# Patient Record
Sex: MALE | Race: WHITE | NOT HISPANIC OR LATINO | Employment: OTHER | URBAN - METROPOLITAN AREA
[De-identification: names, ages, dates, MRNs, and addresses within clinical notes are randomized per-mention and may not be internally consistent; named-entity substitution may affect disease eponyms.]

---

## 2024-03-06 ENCOUNTER — OFFICE VISIT (OUTPATIENT)
Dept: SPORTS MEDICINE | Facility: CLINIC | Age: 39
End: 2024-03-06
Payer: MEDICAID

## 2024-03-06 VITALS
RESPIRATION RATE: 16 BRPM | BODY MASS INDEX: 27.35 KG/M2 | HEART RATE: 74 BPM | DIASTOLIC BLOOD PRESSURE: 70 MMHG | OXYGEN SATURATION: 99 % | HEIGHT: 70 IN | SYSTOLIC BLOOD PRESSURE: 110 MMHG | WEIGHT: 191 LBS

## 2024-03-06 DIAGNOSIS — M54.12 CERVICAL RADICULITIS: Primary | ICD-10-CM

## 2024-03-06 DIAGNOSIS — M25.511 ACUTE PAIN OF RIGHT SHOULDER: ICD-10-CM

## 2024-03-06 DIAGNOSIS — Z79.01 CURRENT USE OF LONG TERM ANTICOAGULATION: ICD-10-CM

## 2024-03-06 RX ORDER — SPIRONOLACTONE 25 MG/1
25 TABLET ORAL DAILY
COMMUNITY

## 2024-03-06 RX ORDER — LOSARTAN POTASSIUM 25 MG/1
25 TABLET ORAL DAILY
COMMUNITY

## 2024-03-06 RX ORDER — METHYLPREDNISOLONE 4 MG/1
TABLET ORAL
Qty: 21 TABLET | Refills: 0 | Status: SHIPPED | OUTPATIENT
Start: 2024-03-06

## 2024-03-06 RX ORDER — WARFARIN SODIUM 5 MG/1
5 TABLET ORAL NIGHTLY
COMMUNITY
Start: 2024-02-14

## 2024-03-06 RX ORDER — ASPIRIN 81 MG/1
81 TABLET, COATED ORAL DAILY
COMMUNITY

## 2024-03-06 RX ORDER — WARFARIN SODIUM 4 MG/1
4 TABLET ORAL NIGHTLY
COMMUNITY
Start: 2024-02-14

## 2024-03-06 RX ORDER — METOPROLOL SUCCINATE 50 MG/1
50 TABLET, EXTENDED RELEASE ORAL DAILY
COMMUNITY
Start: 2024-01-02

## 2024-03-06 NOTE — PROGRESS NOTES
"Vladislav is a 38 y.o. year old male presents to [facility]    Chief Complaint   Patient presents with    Right Shoulder - Pain, Initial Evaluation     Self referral eval for RT shoulder pain x 2 weeks - NKI he an recall, pain with burning sensation on top of his arm and outside the elbow - okay with ROM - here with new x-rays for further evaluation and treatment        History of Present Illness  New patient, new problem.  Referred here by his mother who presents today for appointment.  She has been treated by me in the past with OMT.  Vladislav states that over the past 2 weeks approximately, he has noticed stiffness in his neck, lower cervical pain that radiates into his right arm.  States that the pain will radiate down to mid forearm.  He has seen some steady improvement in his symptoms over the past few days but do persist.  He does not necessarily associate nighttime pain.  Cannot take intake inflammatories as he is chronically on warfarin.  Manual labor.  He does not notice any loss of strength in the hand nor any pain shooting to the hand.    I have reviewed the patient's medical, family, and social history in detail and updated the computerized patient record.    /70 (BP Location: Right arm, Patient Position: Sitting, Cuff Size: Large Adult)   Pulse 74   Resp 16   Ht 177.8 cm (70\")   Wt 86.6 kg (191 lb)   SpO2 99%   BMI 27.41 kg/m²      Physical Exam    Vital signs reviewed.   General: No acute distress.  Eyes: conjunctiva clear; pupils equally round and reactive  ENT: external ears atraumatic  CV: no peripheral edema  Resp: normal respiratory effort, no use of accessory muscles  Skin: no rashes or wounds; normal turgor  Psych: mood and affect appropriate; recent and remote memory intact  Neuro: sensation to light touch intact    MSK Exam  Cervical spine demonstrates full range of motion.  Negative Spurling maneuver bilateral.  2+ DTR C5, C6 bilateral.  R shoulder: " Negative drop arm.  Full range of motion.  Negative empty can.  Negative Malone, scarf, Neer and apprehension test.  Negative Speed test.    Right Shoulder X-Ray  Indication: Pain  Views: AP internal and external    Findings:  No fracture  No bony lesion  Normal soft tissues  Normal joint spaces    No prior studies were available for comparison.             Diagnoses and all orders for this visit:    Cervical radiculitis  -     methylPREDNISolone (MEDROL) 4 MG dose pack; Take as directed on package instructions.    Acute pain of right shoulder  -     XR Shoulder 2+ View Right    Current use of long term anticoagulation    Other orders  -     warfarin (COUMADIN) 4 MG tablet; Take 1 tablet by mouth Every Night.  -     warfarin (COUMADIN) 5 MG tablet; Take 1 tablet by mouth Every Night.  -     losartan (COZAAR) 25 MG tablet; Take 1 tablet by mouth Daily.  -     spironolactone (ALDACTONE) 25 MG tablet; Take 1 tablet by mouth Daily.  -     Aspirin Low Dose 81 MG EC tablet; Take 1 tablet by mouth Daily.  -     metoprolol succinate XL (TOPROL-XL) 50 MG 24 hr tablet; Take 1 tablet by mouth Daily.      Symptoms consistent with cervical radiculitis.  Discussed benign shoulder exam, x-ray.  He does not have any significant somatic dysfunction amenable to OMT.  I recommend Medrol Dosepak.  Consider physical therapy if persistent.          Follow Up     No follow-ups on file.    Patient was given instructions and counseling regarding his condition or for health maintenance advice. Please see specific information pulled into the AVS if appropriate.     EMR Dragon/Transcription disclaimer:    Much of this encounter note is an electronic transcription/translation of spoken language to printed text.  The electronic translation of spoken language may permit erroneous, or at times, nonsensical words or phrases to be inadvertently transcribed.  Although I have reviewed the note for such errors some may still exist.

## 2024-03-08 ENCOUNTER — PATIENT ROUNDING (BHMG ONLY) (OUTPATIENT)
Dept: SPORTS MEDICINE | Facility: CLINIC | Age: 39
End: 2024-03-08
Payer: MEDICAID

## 2024-03-08 NOTE — PROGRESS NOTES
March 8, 2024      A Welcome Card has been sent to the patient for PATIENT ROUNDING with [facility] [place]